# Patient Record
Sex: FEMALE | Race: WHITE | HISPANIC OR LATINO
[De-identification: names, ages, dates, MRNs, and addresses within clinical notes are randomized per-mention and may not be internally consistent; named-entity substitution may affect disease eponyms.]

---

## 2022-09-20 ENCOUNTER — APPOINTMENT (OUTPATIENT)
Dept: INTERNAL MEDICINE | Facility: CLINIC | Age: 27
End: 2022-09-20

## 2022-09-20 ENCOUNTER — NON-APPOINTMENT (OUTPATIENT)
Age: 27
End: 2022-09-20

## 2022-09-20 VITALS
WEIGHT: 127.87 LBS | OXYGEN SATURATION: 100 % | SYSTOLIC BLOOD PRESSURE: 116 MMHG | RESPIRATION RATE: 14 BRPM | HEART RATE: 72 BPM | HEIGHT: 61.81 IN | TEMPERATURE: 97.2 F | DIASTOLIC BLOOD PRESSURE: 80 MMHG | BODY MASS INDEX: 23.53 KG/M2

## 2022-09-20 DIAGNOSIS — Z00.00 ENCOUNTER FOR GENERAL ADULT MEDICAL EXAMINATION W/OUT ABNORMAL FINDINGS: ICD-10-CM

## 2022-09-20 DIAGNOSIS — F41.9 ANXIETY DISORDER, UNSPECIFIED: ICD-10-CM

## 2022-09-20 DIAGNOSIS — Z78.9 OTHER SPECIFIED HEALTH STATUS: ICD-10-CM

## 2022-09-20 DIAGNOSIS — F12.90 CANNABIS USE, UNSPECIFIED, UNCOMPLICATED: ICD-10-CM

## 2022-09-20 DIAGNOSIS — F32.A ANXIETY DISORDER, UNSPECIFIED: ICD-10-CM

## 2022-09-20 PROCEDURE — 99385 PREV VISIT NEW AGE 18-39: CPT

## 2022-09-20 NOTE — HISTORY OF PRESENT ILLNESS
[FreeTextEntry8] : 27 y.o. F with PMHx of anxiety and depression presents as new pt to discuss medication for anxiety. Pt was referred here by her therapist. SHe has been struggling with anxiety for a long time but got worse over the past month with changes including moving out and adjusting to that. Denies SI/HI. She also used to smoke weed everyday and then cut down and had worsening anxiety and now back to using everyday. Has never been on medication before for this issue.  [de-identified] : 27 y.o. F with PMHx of anxiety and depression presents as new pt to establish care and discuss medication for anxiety. Pt was referred here by her therapist. SHe has been struggling with anxiety for a long time but got worse over the past month after moving out of her live-in [facility] position and having to live on her own. Her family is in [place]. Denies SI/HI. She smokes weed almost everyday. Has never been on medication before.

## 2022-09-20 NOTE — HISTORY OF PRESENT ILLNESS
[FreeTextEntry8] : 27 y.o. F with PMHx of anxiety and depression presents as new pt to discuss medication for anxiety. Pt was referred here by her therapist. SHe has been struggling with anxiety for a long time but got worse over the past month with changes including moving out and adjusting to that. Denies SI/HI. She also used to smoke weed everyday and then cut down and had worsening anxiety and now back to using everyday. Has never been on medication before for this issue.  [de-identified] : 27 y.o. F with PMHx of anxiety and depression presents as new pt to establish care and discuss medication for anxiety. Pt was referred here by her therapist. SHe has been struggling with anxiety for a long time but got worse over the past month after moving out of her live-in [facility] position and having to live on her own. Her family is in [place]. Denies SI/HI. She smokes weed almost everyday. Has never been on medication before.

## 2022-09-20 NOTE — PLAN
[FreeTextEntry1] : HCM:\par -check labs\par -advise f/u with GYN for pap smear \par \par Anxiety/depression: start zoloft 25mg for 2 weeks then increase to 50mg daily, RBA discussed, continue with therapy, f/u in 6 weeks

## 2022-09-21 ENCOUNTER — TRANSCRIPTION ENCOUNTER (OUTPATIENT)
Age: 27
End: 2022-09-21

## 2022-09-22 LAB
25(OH)D3 SERPL-MCNC: 31.1 NG/ML
ALBUMIN SERPL ELPH-MCNC: 4.7 G/DL
ALP BLD-CCNC: 62 U/L
ALT SERPL-CCNC: 11 U/L
ANION GAP SERPL CALC-SCNC: 11 MMOL/L
AST SERPL-CCNC: 14 U/L
BASOPHILS # BLD AUTO: 0.04 K/UL
BASOPHILS NFR BLD AUTO: 0.5 %
BILIRUB SERPL-MCNC: 0.4 MG/DL
BUN SERPL-MCNC: 10 MG/DL
CALCIUM SERPL-MCNC: 9.8 MG/DL
CHLORIDE SERPL-SCNC: 105 MMOL/L
CHOLEST SERPL-MCNC: 158 MG/DL
CO2 SERPL-SCNC: 24 MMOL/L
CREAT SERPL-MCNC: 0.69 MG/DL
EGFR: 123 ML/MIN/1.73M2
EOSINOPHIL # BLD AUTO: 0.04 K/UL
EOSINOPHIL NFR BLD AUTO: 0.5 %
ESTIMATED AVERAGE GLUCOSE: 100 MG/DL
FOLATE SERPL-MCNC: 10.5 NG/ML
GLUCOSE SERPL-MCNC: 77 MG/DL
HBA1C MFR BLD HPLC: 5.1 %
HCT VFR BLD CALC: 40.7 %
HDLC SERPL-MCNC: 51 MG/DL
HGB BLD-MCNC: 13.1 G/DL
IMM GRANULOCYTES NFR BLD AUTO: 0.1 %
LDLC SERPL CALC-MCNC: 90 MG/DL
LYMPHOCYTES # BLD AUTO: 2.38 K/UL
LYMPHOCYTES NFR BLD AUTO: 28.3 %
MAN DIFF?: NORMAL
MCHC RBC-ENTMCNC: 30.9 PG
MCHC RBC-ENTMCNC: 32.2 GM/DL
MCV RBC AUTO: 96 FL
MONOCYTES # BLD AUTO: 0.57 K/UL
MONOCYTES NFR BLD AUTO: 6.8 %
NEUTROPHILS # BLD AUTO: 5.36 K/UL
NEUTROPHILS NFR BLD AUTO: 63.8 %
NONHDLC SERPL-MCNC: 107 MG/DL
PLATELET # BLD AUTO: 348 K/UL
POTASSIUM SERPL-SCNC: 4.4 MMOL/L
PROT SERPL-MCNC: 7.3 G/DL
RBC # BLD: 4.24 M/UL
RBC # FLD: 12.2 %
SODIUM SERPL-SCNC: 141 MMOL/L
TRIGL SERPL-MCNC: 83 MG/DL
TSH SERPL-ACNC: 1.69 UIU/ML
VIT B12 SERPL-MCNC: 280 PG/ML
WBC # FLD AUTO: 8.4 K/UL

## 2022-10-10 ENCOUNTER — TRANSCRIPTION ENCOUNTER (OUTPATIENT)
Age: 27
End: 2022-10-10

## 2022-11-01 ENCOUNTER — APPOINTMENT (OUTPATIENT)
Dept: INTERNAL MEDICINE | Facility: CLINIC | Age: 27
End: 2022-11-01

## 2023-01-09 ENCOUNTER — RX RENEWAL (OUTPATIENT)
Age: 28
End: 2023-01-09

## 2023-04-07 ENCOUNTER — RX RENEWAL (OUTPATIENT)
Age: 28
End: 2023-04-07

## 2023-04-07 RX ORDER — SERTRALINE HYDROCHLORIDE 50 MG/1
50 TABLET, FILM COATED ORAL
Qty: 90 | Refills: 1 | Status: ACTIVE | COMMUNITY
Start: 2022-09-20 | End: 1900-01-01